# Patient Record
Sex: FEMALE | Race: WHITE | NOT HISPANIC OR LATINO | Employment: FULL TIME | URBAN - METROPOLITAN AREA
[De-identification: names, ages, dates, MRNs, and addresses within clinical notes are randomized per-mention and may not be internally consistent; named-entity substitution may affect disease eponyms.]

---

## 2023-08-26 ENCOUNTER — OFFICE VISIT (OUTPATIENT)
Dept: URGENT CARE | Facility: CLINIC | Age: 33
End: 2023-08-26
Payer: COMMERCIAL

## 2023-08-26 VITALS
OXYGEN SATURATION: 97 % | RESPIRATION RATE: 18 BRPM | TEMPERATURE: 96.9 F | BODY MASS INDEX: 31.49 KG/M2 | WEIGHT: 189 LBS | HEART RATE: 76 BPM | DIASTOLIC BLOOD PRESSURE: 76 MMHG | HEIGHT: 65 IN | SYSTOLIC BLOOD PRESSURE: 114 MMHG

## 2023-08-26 DIAGNOSIS — J02.9 PHARYNGITIS, UNSPECIFIED ETIOLOGY: ICD-10-CM

## 2023-08-26 LAB — S PYO DNA SPEC NAA+PROBE: NOT DETECTED

## 2023-08-26 PROCEDURE — 99213 OFFICE O/P EST LOW 20 MIN: CPT | Performed by: FAMILY MEDICINE

## 2023-08-26 PROCEDURE — 3074F SYST BP LT 130 MM HG: CPT | Performed by: FAMILY MEDICINE

## 2023-08-26 PROCEDURE — 87651 STREP A DNA AMP PROBE: CPT | Performed by: FAMILY MEDICINE

## 2023-08-26 PROCEDURE — 3078F DIAST BP <80 MM HG: CPT | Performed by: FAMILY MEDICINE

## 2023-08-26 RX ORDER — LAMOTRIGINE 100 MG/1
100 TABLET ORAL DAILY
COMMUNITY

## 2023-08-26 RX ORDER — AMOXICILLIN 500 MG/1
500 CAPSULE ORAL 2 TIMES DAILY
Qty: 20 CAPSULE | Refills: 0 | Status: SHIPPED | OUTPATIENT
Start: 2023-08-26 | End: 2023-09-05

## 2023-08-26 RX ORDER — VENLAFAXINE 50 MG/1
150 TABLET ORAL
COMMUNITY

## 2023-08-26 ASSESSMENT — ENCOUNTER SYMPTOMS
COUGH: 0
SORE THROAT: 1

## 2023-08-26 NOTE — PROGRESS NOTES
"Subjective:     Yadira Allan is a 32 y.o. female who presents for Pharyngitis (X 1 day, sore throat)    HPI  Pt presents for evaluation of an acute problem  Patient with pharyngitis for the past day  No cough or congestion   No vomiting or diarrhea   Sore throat is equal bilaterally   Uvula feels swollen     Review of Systems   HENT:  Positive for sore throat.    Respiratory:  Negative for cough.        PMH:  has no past medical history on file.  MEDS:   Current Outpatient Medications:     venlafaxine (EFFEXOR) 50 MG tablet, Take 150 mg by mouth 1 time a day as needed., Disp: , Rfl:     lamoTRIgine (LAMICTAL) 100 MG Tab, Take 100 mg by mouth every day., Disp: , Rfl:     buPROPion HCl (WELLBUTRIN XL PO), Take 100 mg by mouth 1 time a day as needed., Disp: , Rfl:     Amphetamine-Dextroamphetamine (ADDERALL PO), Take 10 mg by mouth 1 time a day as needed., Disp: , Rfl:     amoxicillin (AMOXIL) 500 MG Cap, Take 1 Capsule by mouth 2 times a day for 10 days., Disp: 20 Capsule, Rfl: 0  ALLERGIES: No Known Allergies  SURGHX: History reviewed. No pertinent surgical history.  SOCHX:  reports that she has never smoked. She has never used smokeless tobacco. She reports that she does not drink alcohol.     Objective:   /76 (BP Location: Right arm, Patient Position: Sitting, BP Cuff Size: Adult)   Pulse 76   Temp 36.1 °C (96.9 °F) (Temporal)   Resp 18   Ht 1.651 m (5' 5\")   Wt 85.7 kg (189 lb)   SpO2 97%   BMI 31.45 kg/m²     Physical Exam  Constitutional:       General: She is not in acute distress.     Appearance: She is well-developed. She is not diaphoretic.   HENT:      Head: Normocephalic and atraumatic.      Right Ear: Tympanic membrane, ear canal and external ear normal.      Left Ear: Tympanic membrane, ear canal and external ear normal.      Nose: Nose normal.      Mouth/Throat:      Mouth: Mucous membranes are moist.      Comments: Moderate erythema and swelling of the uvular with mild erythema " throughout the posterior pharynx.  No exudate present, no unilateral swelling, no uvular deviation   Pulmonary:      Effort: Pulmonary effort is normal.   Musculoskeletal:      Cervical back: Normal range of motion and neck supple. No tenderness.   Lymphadenopathy:      Cervical: No cervical adenopathy.   Neurological:      Mental Status: She is alert.         Assessment/Plan:   Assessment    1. Pharyngitis, unspecified etiology  - POCT GROUP A STREP, PCR  - amoxicillin (AMOXIL) 500 MG Cap; Take 1 Capsule by mouth 2 times a day for 10 days.  Dispense: 20 Capsule; Refill: 0    Other orders  - venlafaxine (EFFEXOR) 50 MG tablet; Take 150 mg by mouth 1 time a day as needed.  - lamoTRIgine (LAMICTAL) 100 MG Tab; Take 100 mg by mouth every day.  - buPROPion HCl (WELLBUTRIN XL PO); Take 100 mg by mouth 1 time a day as needed.  - Amphetamine-Dextroamphetamine (ADDERALL PO); Take 10 mg by mouth 1 time a day as needed.    Pt with pharyngitis and uvulitis.  Strep testing negative.  She is going to be out of town for a while and with limited access to healthcare.  Given contingent abx and recommended to treat with supportive care only for now.  Pt agreeable and will F/U as needed.